# Patient Record
Sex: FEMALE | Race: WHITE | NOT HISPANIC OR LATINO | URBAN - METROPOLITAN AREA
[De-identification: names, ages, dates, MRNs, and addresses within clinical notes are randomized per-mention and may not be internally consistent; named-entity substitution may affect disease eponyms.]

---

## 2022-03-18 ENCOUNTER — PREPPED CHART (OUTPATIENT)
Dept: URBAN - METROPOLITAN AREA CLINIC 80 | Facility: CLINIC | Age: 57
End: 2022-03-18

## 2022-03-18 PROBLEM — H52.13 MYOPIA: Noted: 2022-03-18

## 2022-03-18 PROBLEM — Z98.890 ANATOMIC NARROW ANGLE S/P PI: Noted: 2022-03-18

## 2022-03-18 PROBLEM — H35.412 LATTICE DEGENERATION OF RETINA: Noted: 2022-03-18

## 2022-03-18 PROBLEM — H33.312 RETINAL TEAR: Noted: 2022-03-18

## 2022-03-18 PROBLEM — H33.321 ATROPHIC RETINAL HOLE: Noted: 2022-03-18

## 2022-03-18 PROBLEM — H35.363 MACULAR DRUSEN: Status: STABILIZING | Noted: 2022-03-18

## 2022-03-18 PROBLEM — H35.363 MACULAR DRUSEN: Noted: 2022-03-18

## 2022-03-18 PROBLEM — H25.13 NUCLEAR SCLEROSIS: Status: STABILIZING | Noted: 2022-03-18

## 2022-03-18 PROBLEM — H33.321 ATROPHIC RETINAL HOLE: Status: STABILIZING | Noted: 2022-03-18

## 2022-03-18 PROBLEM — H33.312 TREATED HORSESHOE TEAR: Status: WELL-CONTROLLED | Noted: 2022-03-18

## 2022-03-18 PROBLEM — H43.812 POSTERIOR VITREOUS DETACHMENT: Status: STABILIZING | Noted: 2022-03-18

## 2022-03-18 PROBLEM — Z98.890 ANATOMIC NARROW ANGLE: Status: STABILIZING | Noted: 2022-03-18

## 2022-03-18 PROBLEM — H43.812 POSTERIOR VITREOUS DETACHMENT: Noted: 2022-03-18

## 2022-03-18 PROBLEM — H25.13 NS CATARACT: Noted: 2022-03-18

## 2022-03-18 PROBLEM — H35.412 TREATED LATTICE DEGENERATION: Status: WELL-CONTROLLED | Noted: 2022-03-18

## 2022-04-15 ASSESSMENT — VISUAL ACUITY: OS_CC: 20/20

## 2022-04-15 ASSESSMENT — TONOMETRY: OS_IOP_MMHG: 19

## 2022-04-20 ENCOUNTER — PREPPED CHART (OUTPATIENT)
Dept: URBAN - METROPOLITAN AREA CLINIC 80 | Facility: CLINIC | Age: 57
End: 2022-04-20

## 2022-04-20 DIAGNOSIS — H35.363: ICD-10-CM

## 2022-04-20 DIAGNOSIS — H35.412: ICD-10-CM

## 2022-04-20 DIAGNOSIS — H43.812: ICD-10-CM

## 2022-04-20 PROCEDURE — 92201 OPSCPY EXTND RTA DRAW UNI/BI: CPT

## 2022-04-20 PROCEDURE — 92014 COMPRE OPH EXAM EST PT 1/>: CPT

## 2022-04-20 PROCEDURE — 92134 CPTRZ OPH DX IMG PST SGM RTA: CPT

## 2022-04-20 ASSESSMENT — TONOMETRY
OS_IOP_MMHG: 19
OD_IOP_MMHG: 16

## 2022-04-20 ASSESSMENT — VISUAL ACUITY
OS_CC: 20/25-1
OD_CC: 20/40

## 2022-08-12 ENCOUNTER — FOLLOW UP (OUTPATIENT)
Dept: URBAN - METROPOLITAN AREA CLINIC 64 | Facility: CLINIC | Age: 57
End: 2022-08-12

## 2022-08-12 DIAGNOSIS — H33.321: ICD-10-CM

## 2022-08-12 DIAGNOSIS — H35.412: ICD-10-CM

## 2022-08-12 DIAGNOSIS — H33.312: ICD-10-CM

## 2022-08-12 DIAGNOSIS — H43.812: ICD-10-CM

## 2022-08-12 DIAGNOSIS — H35.363: ICD-10-CM

## 2022-08-12 DIAGNOSIS — H25.13: ICD-10-CM

## 2022-08-12 DIAGNOSIS — Z98.890: ICD-10-CM

## 2022-08-12 PROCEDURE — 92014 COMPRE OPH EXAM EST PT 1/>: CPT

## 2022-08-12 PROCEDURE — 92202 OPSCPY EXTND ON/MAC DRAW: CPT

## 2022-08-12 PROCEDURE — 92134 CPTRZ OPH DX IMG PST SGM RTA: CPT

## 2022-08-12 ASSESSMENT — VISUAL ACUITY
OS_CC: 20/25-1
OD_CC: 20/30+2

## 2022-08-12 ASSESSMENT — TONOMETRY
OD_IOP_MMHG: 14
OS_IOP_MMHG: 15

## 2024-12-17 ENCOUNTER — TELEPHONE (OUTPATIENT)
Age: 59
End: 2024-12-17

## 2024-12-17 NOTE — TELEPHONE ENCOUNTER
Called patient in regards to a referral from [facility] for right upper quadrant pain. Called patient and she stated she was not ready to schedule a consult at this moment. Advised patient to give the office a call when she was ready to schedule.

## 2025-07-14 ENCOUNTER — OFFICE VISIT (OUTPATIENT)
Age: 60
End: 2025-07-14

## 2025-07-14 VITALS
HEIGHT: 68 IN | OXYGEN SATURATION: 96 % | HEART RATE: 75 BPM | RESPIRATION RATE: 15 BRPM | TEMPERATURE: 98.5 F | WEIGHT: 227 LBS | BODY MASS INDEX: 34.4 KG/M2

## 2025-07-14 DIAGNOSIS — N30.00 ACUTE CYSTITIS WITHOUT HEMATURIA: Primary | ICD-10-CM

## 2025-07-14 LAB
BILIRUBIN, URINE, POC: NEGATIVE
BLOOD URINE, POC: NEGATIVE
GLUCOSE URINE, POC: NEGATIVE
KETONES, URINE, POC: NEGATIVE
LEUKOCYTE ESTERASE, URINE, POC: NEGATIVE
NITRITE, URINE, POC: POSITIVE
PH, URINE, POC: 5 (ref 4.6–8)
PROTEIN,URINE, POC: NEGATIVE
SPECIFIC GRAVITY, URINE, POC: 1.02 (ref 1–1.03)
URINALYSIS CLARITY, POC: CLEAR
URINALYSIS COLOR, POC: ABNORMAL
UROBILINOGEN, POC: ABNORMAL MG/DL

## 2025-07-14 RX ORDER — IBUPROFEN 200 MG
400 TABLET ORAL EVERY 6 HOURS PRN
COMMUNITY

## 2025-07-14 RX ORDER — PHENAZOPYRIDINE HYDROCHLORIDE 200 MG/1
200 TABLET, FILM COATED ORAL 3 TIMES DAILY PRN
Qty: 9 TABLET | Refills: 0 | Status: SHIPPED | OUTPATIENT
Start: 2025-07-14 | End: 2025-07-17

## 2025-07-14 RX ORDER — MULTIVIT-MIN/IRON FUM/FOLIC AC 7.5 MG-4
1 TABLET ORAL DAILY
COMMUNITY

## 2025-07-14 RX ORDER — CEPHALEXIN 500 MG/1
500 CAPSULE ORAL 2 TIMES DAILY
Qty: 14 CAPSULE | Refills: 0 | Status: SHIPPED | OUTPATIENT
Start: 2025-07-14 | End: 2025-07-21

## 2025-07-14 RX ORDER — ERGOCALCIFEROL 1.25 MG/1
CAPSULE, LIQUID FILLED ORAL
COMMUNITY
Start: 2025-07-03

## 2025-07-14 ASSESSMENT — ENCOUNTER SYMPTOMS
GASTROINTESTINAL NEGATIVE: 1
RESPIRATORY NEGATIVE: 1
ALLERGIC/IMMUNOLOGIC NEGATIVE: 1
EYES NEGATIVE: 1

## 2025-07-14 NOTE — PATIENT INSTRUCTIONS
Thank you for visiting [facility] today.    -Increase fluid intake.  Some people say cranberry juice helps with discomfort.  -Don't hold your urine.  Urinate when you feel like you need to.  -Wipe from front to back after bowel movements.  -If sexually active, urinate after intercourse and use enough lubrication.   -Avoid taking bubble baths.  -Wear loose fitting clothing.  -Decrease caffeine or carbonated drinks  -Repeat urine screen in two weeks  -Take antibiotic with food and consider probiotic    Follow up with your PCP if symptoms persist or worsen.    Please go to the Emergency Department immediately for symptoms of a urinary tract infection along with any of the following:  fever with severe and sudden shaking (rigors), nausea, vomiting and the inability to keep down clear fluids or medications.

## 2025-07-14 NOTE — PROGRESS NOTES
2025   Kiersten Gilman (: 1965) is a 60 y.o. female, here for evaluation of the following chief complaint(s):  Urinary Tract Infection (Pt c/o urinary urgency, frequency, pain with urination ongoing for almost one month. She's taken AZO and cystex with little relief.)     ASSESSMENT/PLAN:  Below is the assessment and plan developed based on review of pertinent history, physical exam, labs, studies, and medications.         Assessment & Plan  Acute cystitis without hematuria   The patient presents with symptoms suspicious for uncomplicated acute cystitis.  No signs or symptoms suggest an infection extending beyond the bladder or sepsis, including but not limited to, costovertebral angle tenderness or signs/symptoms of systemic illness such as fever, chills, rigors, significant fatigue, or malaise beyond baseline.  Also considered, but unlikely, pelvic inflammatory disease, due to the lack of lower abdominal/pelvic pain and fever.  No hematuria with unilateral flank pain suggestive of obstructive nephrolithiasis.  Patient is nontoxic appearing and not in need of emergent medical intervention.    Will treat empirically for acute simple cystitis based on symptoms and urinalysis results.  Patient is tolerating PO intake and able to comply with outpatient management.  Explicit instructions have been given to go to the ED if unable to tolerate the antibiotics, develop a fever or increasing back or abdominal pain.  Patient also aware the initial antibiotic choice has been selected based on empiric therapy, and that there is a possibility that the organism causing this infection could be resistant.    Patient advised because hematuria was noted on initial presentation, a urinalysis should be repeated several weeks following antimicrobial therapy by their PCP to evaluate for persistent hematuria.    -Antibiotic prescribed.    -Pyridium prescribed for relief of burning sensation causes a reddish-orange

## 2025-07-21 ENCOUNTER — TELEPHONE (OUTPATIENT)
Age: 60
End: 2025-07-21

## 2025-07-21 NOTE — TELEPHONE ENCOUNTER
Patient called for Medication refill. Advised Patient needed to be re-evaluated at clinic. Patient understood.